# Patient Record
Sex: MALE | Race: WHITE | NOT HISPANIC OR LATINO | Employment: FULL TIME | URBAN - NONMETROPOLITAN AREA
[De-identification: names, ages, dates, MRNs, and addresses within clinical notes are randomized per-mention and may not be internally consistent; named-entity substitution may affect disease eponyms.]

---

## 2018-03-07 ENCOUNTER — HOSPITAL ENCOUNTER (EMERGENCY)
Facility: HOSPITAL | Age: 33
Discharge: HOME | End: 2018-03-07
Attending: EMERGENCY MEDICINE | Admitting: EMERGENCY MEDICINE

## 2018-03-07 VITALS
OXYGEN SATURATION: 99 % | TEMPERATURE: 98.1 F | HEIGHT: 70 IN | SYSTOLIC BLOOD PRESSURE: 127 MMHG | BODY MASS INDEX: 37.22 KG/M2 | RESPIRATION RATE: 18 BRPM | WEIGHT: 260 LBS | DIASTOLIC BLOOD PRESSURE: 74 MMHG | HEART RATE: 95 BPM

## 2018-03-07 DIAGNOSIS — S81.812A LACERATION OF LEFT LEG, INITIAL ENCOUNTER: Primary | ICD-10-CM

## 2018-03-07 PROCEDURE — 90715 TDAP VACCINE 7 YRS/> IM: CPT | Performed by: NURSE PRACTITIONER

## 2018-03-07 PROCEDURE — 25010000002 TDAP 5-2.5-18.5 LF-MCG/0.5 SUSPENSION: Performed by: NURSE PRACTITIONER

## 2018-03-07 PROCEDURE — 90471 IMMUNIZATION ADMIN: CPT | Performed by: NURSE PRACTITIONER

## 2018-03-07 PROCEDURE — 99283 EMERGENCY DEPT VISIT LOW MDM: CPT

## 2018-03-07 RX ORDER — OXYCODONE AND ACETAMINOPHEN 10; 325 MG/1; MG/1
1 TABLET ORAL ONCE
Status: COMPLETED | OUTPATIENT
Start: 2018-03-07 | End: 2018-03-07

## 2018-03-07 RX ORDER — BACITRACIN, NEOMYCIN, POLYMYXIN B 400; 3.5; 5 [USP'U]/G; MG/G; [USP'U]/G
OINTMENT TOPICAL
Status: COMPLETED
Start: 2018-03-07 | End: 2018-03-07

## 2018-03-07 RX ORDER — BACITRACIN, NEOMYCIN, POLYMYXIN B 400; 3.5; 5 [USP'U]/G; MG/G; [USP'U]/G
OINTMENT TOPICAL EVERY 6 HOURS SCHEDULED
Status: COMPLETED | OUTPATIENT
Start: 2018-03-07 | End: 2018-03-07

## 2018-03-07 RX ADMIN — BACITRACIN, NEOMYCIN, POLYMYXIN B 1 APPLICATION: 400; 3.5; 5 OINTMENT TOPICAL at 16:20

## 2018-03-07 RX ADMIN — TETANUS TOXOID, REDUCED DIPHTHERIA TOXOID AND ACELLULAR PERTUSSIS VACCINE, ADSORBED 0.5 ML: 5; 2.5; 8; 8; 2.5 SUSPENSION INTRAMUSCULAR at 15:22

## 2018-03-07 RX ADMIN — OXYCODONE HYDROCHLORIDE AND ACETAMINOPHEN 1 TABLET: 10; 325 TABLET ORAL at 15:21

## 2018-03-07 NOTE — DISCHARGE INSTRUCTIONS
"    Hypertension  Hypertension, commonly called high blood pressure, is when the force of blood pumping through the arteries is too strong. The arteries are the blood vessels that carry blood from the heart throughout the body. Hypertension forces the heart to work harder to pump blood and may cause arteries to become narrow or stiff. Having untreated or uncontrolled hypertension can cause heart attacks, strokes, kidney disease, and other problems.  A blood pressure reading consists of a higher number over a lower number. Ideally, your blood pressure should be below 120/80. The first (\"top\") number is called the systolic pressure. It is a measure of the pressure in your arteries as your heart beats. The second (\"bottom\") number is called the diastolic pressure. It is a measure of the pressure in your arteries as the heart relaxes.  What are the causes?  The cause of this condition is not known.  What increases the risk?  Some risk factors for high blood pressure are under your control. Others are not.  Factors you can change   · Smoking.  · Having type 2 diabetes mellitus, high cholesterol, or both.  · Not getting enough exercise or physical activity.  · Being overweight.  · Having too much fat, sugar, calories, or salt (sodium) in your diet.  · Drinking too much alcohol.  Factors that are difficult or impossible to change   · Having chronic kidney disease.  · Having a family history of high blood pressure.  · Age. Risk increases with age.  · Race. You may be at higher risk if you are -American.  · Gender. Men are at higher risk than women before age 45. After age 65, women are at higher risk than men.  · Having obstructive sleep apnea.  · Stress.  What are the signs or symptoms?  Extremely high blood pressure (hypertensive crisis) may cause:  · Headache.  · Anxiety.  · Shortness of breath.  · Nosebleed.  · Nausea and vomiting.  · Severe chest pain.  · Jerky movements you cannot control (seizures).  How is " this diagnosed?  This condition is diagnosed by measuring your blood pressure while you are seated, with your arm resting on a surface. The cuff of the blood pressure monitor will be placed directly against the skin of your upper arm at the level of your heart. It should be measured at least twice using the same arm. Certain conditions can cause a difference in blood pressure between your right and left arms.  Certain factors can cause blood pressure readings to be lower or higher than normal (elevated) for a short period of time:  · When your blood pressure is higher when you are in a health care provider's office than when you are at home, this is called white coat hypertension. Most people with this condition do not need medicines.  · When your blood pressure is higher at home than when you are in a health care provider's office, this is called masked hypertension. Most people with this condition may need medicines to control blood pressure.  If you have a high blood pressure reading during one visit or you have normal blood pressure with other risk factors:  · You may be asked to return on a different day to have your blood pressure checked again.  · You may be asked to monitor your blood pressure at home for 1 week or longer.  If you are diagnosed with hypertension, you may have other blood or imaging tests to help your health care provider understand your overall risk for other conditions.  How is this treated?  This condition is treated by making healthy lifestyle changes, such as eating healthy foods, exercising more, and reducing your alcohol intake. Your health care provider may prescribe medicine if lifestyle changes are not enough to get your blood pressure under control, and if:  · Your systolic blood pressure is above 130.  · Your diastolic blood pressure is above 80.  Your personal target blood pressure may vary depending on your medical conditions, your age, and other factors.  Follow these  instructions at home:  Eating and drinking   · Eat a diet that is high in fiber and potassium, and low in sodium, added sugar, and fat. An example eating plan is called the DASH (Dietary Approaches to Stop Hypertension) diet. To eat this way:  ¨ Eat plenty of fresh fruits and vegetables. Try to fill half of your plate at each meal with fruits and vegetables.  ¨ Eat whole grains, such as whole wheat pasta, brown rice, or whole grain bread. Fill about one quarter of your plate with whole grains.  ¨ Eat or drink low-fat dairy products, such as skim milk or low-fat yogurt.  ¨ Avoid fatty cuts of meat, processed or cured meats, and poultry with skin. Fill about one quarter of your plate with lean proteins, such as fish, chicken without skin, beans, eggs, and tofu.  ¨ Avoid premade and processed foods. These tend to be higher in sodium, added sugar, and fat.  · Reduce your daily sodium intake. Most people with hypertension should eat less than 1,500 mg of sodium a day.  · Limit alcohol intake to no more than 1 drink a day for nonpregnant women and 2 drinks a day for men. One drink equals 12 oz of beer, 5 oz of wine, or 1½ oz of hard liquor.  Lifestyle   · Work with your health care provider to maintain a healthy body weight or to lose weight. Ask what an ideal weight is for you.  · Get at least 30 minutes of exercise that causes your heart to beat faster (aerobic exercise) most days of the week. Activities may include walking, swimming, or biking.  · Include exercise to strengthen your muscles (resistance exercise), such as pilates or lifting weights, as part of your weekly exercise routine. Try to do these types of exercises for 30 minutes at least 3 days a week.  · Do not use any products that contain nicotine or tobacco, such as cigarettes and e-cigarettes. If you need help quitting, ask your health care provider.  · Monitor your blood pressure at home as told by your health care provider.  · Keep all follow-up visits  as told by your health care provider. This is important.  Medicines   · Take over-the-counter and prescription medicines only as told by your health care provider. Follow directions carefully. Blood pressure medicines must be taken as prescribed.  · Do not skip doses of blood pressure medicine. Doing this puts you at risk for problems and can make the medicine less effective.  · Ask your health care provider about side effects or reactions to medicines that you should watch for.  Contact a health care provider if:  · You think you are having a reaction to a medicine you are taking.  · You have headaches that keep coming back (recurring).  · You feel dizzy.  · You have swelling in your ankles.  · You have trouble with your vision.  Get help right away if:  · You develop a severe headache or confusion.  · You have unusual weakness or numbness.  · You feel faint.  · You have severe pain in your chest or abdomen.  · You vomit repeatedly.  · You have trouble breathing.  Summary  · Hypertension is when the force of blood pumping through your arteries is too strong. If this condition is not controlled, it may put you at risk for serious complications.  · Your personal target blood pressure may vary depending on your medical conditions, your age, and other factors. For most people, a normal blood pressure is less than 120/80.  · Hypertension is treated with lifestyle changes, medicines, or a combination of both. Lifestyle changes include weight loss, eating a healthy, low-sodium diet, exercising more, and limiting alcohol.  This information is not intended to replace advice given to you by your health care provider. Make sure you discuss any questions you have with your health care provider.  Document Released: 12/18/2006 Document Revised: 11/15/2017 Document Reviewed: 11/15/2017  [employer] Interactive Patient Education © 2017 [employer]

## 2018-03-21 NOTE — ED PROVIDER NOTES
Subjective   History of Present Illness    Review of Systems    History reviewed. No pertinent past medical history.    No Known Allergies    Past Surgical History:   Procedure Laterality Date   • APPENDECTOMY     • HAND SURGERY     • KNEE ACL RECONSTRUCTION         History reviewed. No pertinent family history.    Social History     Social History   • Marital status:      Social History Main Topics   • Smoking status: Never Smoker   • Drug use: Unknown     Other Topics Concern   • Not on file           Objective   Physical Exam    Laceration Repair  Date/Time: 3/21/2018 5:29 PM  Performed by: MICHAEL NEW  Authorized by: MICHAEL ENW     Consent:     Consent obtained:  Verbal    Consent given by:  Patient    Risks discussed:  Infection, pain and poor cosmetic result  Anesthesia (see MAR for exact dosages):     Anesthesia method:  Local infiltration    Local anesthetic:  Lidocaine 1% w/o epi  Laceration details:     Location:  Leg    Leg location:  L upper leg    Length (cm):  5    Depth (mm):  10  Repair type:     Repair type:  Simple  Pre-procedure details:     Preparation:  Patient was prepped and draped in usual sterile fashion  Exploration:     Wound exploration: wound explored through full range of motion    Treatment:     Area cleansed with:  Betadine and saline    Amount of cleaning:  Extensive    Visualized foreign bodies/material removed: no    Skin repair:     Repair method:  Sutures    Suture size:  3-0    Suture material:  Nylon    Number of sutures:  6  Approximation:     Approximation:  Loose  Post-procedure details:     Dressing:  Sterile dressing             ED Course  ED Course                  MDM    Final diagnoses:   Laceration of left leg, initial encounter            Michael New MD  03/21/18 0344

## 2018-03-22 NOTE — ED PROVIDER NOTES
Subjective     History provided by:  Patient  Laceration   Location:  Leg  Leg laceration location:  L upper leg  Length:  5  Depth:  Cutaneous  Bleeding: venous    Laceration mechanism:  Metal edge  Pain details:     Quality:  Throbbing    Severity:  Mild  Foreign body present:  No foreign bodies  Relieved by:  None tried  Worsened by:  Movement  Ineffective treatments:  None tried  Tetanus status:  Out of date  Associated symptoms: no fever        Review of Systems   Constitutional: Negative.  Negative for fever.   HENT: Negative.    Respiratory: Negative.    Cardiovascular: Negative.  Negative for chest pain.   Gastrointestinal: Negative.  Negative for abdominal pain.   Endocrine: Negative.    Genitourinary: Negative.  Negative for dysuria.   Skin: Negative.    Neurological: Negative.    Psychiatric/Behavioral: Negative.    All other systems reviewed and are negative.      History reviewed. No pertinent past medical history.    No Known Allergies    Past Surgical History:   Procedure Laterality Date   • APPENDECTOMY     • HAND SURGERY     • KNEE ACL RECONSTRUCTION         History reviewed. No pertinent family history.    Social History     Social History   • Marital status:      Social History Main Topics   • Smoking status: Never Smoker   • Drug use: Unknown     Other Topics Concern   • Not on file           Objective   Physical Exam   Constitutional: He is oriented to person, place, and time. He appears well-developed and well-nourished. No distress.   HENT:   Head: Normocephalic and atraumatic.   Right Ear: External ear normal.   Left Ear: External ear normal.   Nose: Nose normal.   Eyes: Conjunctivae and EOM are normal. Pupils are equal, round, and reactive to light.   Neck: Normal range of motion. Neck supple. No JVD present. No tracheal deviation present.   Cardiovascular: Normal rate, regular rhythm and normal heart sounds.    No murmur heard.  Pulmonary/Chest: Effort normal and breath sounds  normal. No respiratory distress. He has no wheezes.   Abdominal: Soft. Bowel sounds are normal. There is no tenderness.   Musculoskeletal: Normal range of motion. He exhibits no edema or deformity.   Neurological: He is alert and oriented to person, place, and time. No cranial nerve deficit.   Skin: Skin is warm and dry. Laceration noted. No rash noted. He is not diaphoretic. No erythema. No pallor.   Psychiatric: He has a normal mood and affect. His behavior is normal. Thought content normal.   Nursing note and vitals reviewed.      Procedures         ED Course  ED Course                  MDM    Final diagnoses:   Laceration of left leg, initial encounter            Kathie Hull, APRBURTON  03/21/18 2007

## 2019-09-23 ENCOUNTER — TRANSCRIBE ORDERS (OUTPATIENT)
Dept: ADMINISTRATIVE | Facility: HOSPITAL | Age: 34
End: 2019-09-23

## 2019-09-23 DIAGNOSIS — M54.5 LOW BACK PAIN, UNSPECIFIED BACK PAIN LATERALITY, UNSPECIFIED CHRONICITY, WITH SCIATICA PRESENCE UNSPECIFIED: Primary | ICD-10-CM

## 2019-10-15 ENCOUNTER — OFFICE VISIT (OUTPATIENT)
Dept: UROLOGY | Facility: CLINIC | Age: 34
End: 2019-10-15

## 2019-10-15 VITALS
WEIGHT: 274 LBS | BODY MASS INDEX: 39.22 KG/M2 | DIASTOLIC BLOOD PRESSURE: 84 MMHG | HEIGHT: 70 IN | SYSTOLIC BLOOD PRESSURE: 134 MMHG

## 2019-10-15 DIAGNOSIS — E29.1 HYPOGONADISM, TESTICULAR: Primary | ICD-10-CM

## 2019-10-15 DIAGNOSIS — R35.0 FREQUENCY OF URINATION: ICD-10-CM

## 2019-10-15 LAB
BILIRUB BLD-MCNC: NEGATIVE MG/DL
CLARITY, POC: CLEAR
COLOR UR: YELLOW
GLUCOSE UR STRIP-MCNC: NEGATIVE MG/DL
KETONES UR QL: NEGATIVE
LEUKOCYTE EST, POC: NEGATIVE
NITRITE UR-MCNC: NEGATIVE MG/ML
PH UR: 7.5 [PH] (ref 5–8)
PROT UR STRIP-MCNC: NEGATIVE MG/DL
RBC # UR STRIP: NEGATIVE /UL
SP GR UR: 1.01 (ref 1–1.03)
UROBILINOGEN UR QL: NORMAL

## 2019-10-15 PROCEDURE — 81003 URINALYSIS AUTO W/O SCOPE: CPT | Performed by: UROLOGY

## 2019-10-15 PROCEDURE — 99204 OFFICE O/P NEW MOD 45 MIN: CPT | Performed by: UROLOGY

## 2019-10-15 RX ORDER — FINASTERIDE 5 MG/1
2.5 TABLET, FILM COATED ORAL DAILY
COMMUNITY
End: 2019-10-15

## 2019-10-15 RX ORDER — ESCITALOPRAM OXALATE 20 MG/1
TABLET ORAL DAILY
COMMUNITY
Start: 2019-07-31

## 2019-10-15 RX ORDER — TESTOSTERONE CYPIONATE 200 MG/ML
200 INJECTION, SOLUTION INTRAMUSCULAR WEEKLY
Qty: 10 ML | Refills: 5 | Status: SHIPPED | OUTPATIENT
Start: 2019-10-15 | End: 2019-10-16

## 2019-10-15 RX ORDER — OMEPRAZOLE 20 MG/1
CAPSULE, DELAYED RELEASE ORAL DAILY
COMMUNITY
Start: 2019-10-10

## 2019-10-15 RX ORDER — LAMOTRIGINE 100 MG/1
100 TABLET ORAL DAILY
COMMUNITY

## 2019-10-15 RX ORDER — ANASTROZOLE 1 MG/1
1 TABLET ORAL DAILY
COMMUNITY
End: 2020-09-29

## 2019-10-15 RX ORDER — BUSPIRONE HYDROCHLORIDE 5 MG/1
TABLET ORAL
COMMUNITY
Start: 2019-08-06

## 2019-10-15 NOTE — PROGRESS NOTES
Chief Complaint:          hypogonadism    HPI:   34 y.o. male.  Pt has been on testosterone for low testosterone.  He was on 1 cc week and this was upped to 1 1/2 cc weekly.  His testosterone was 370 cc 3 days after 1 cc injection.  HPI  Pt has been placed on finasteride for excessive hair growth.    Past Medical History:        Past Medical History:   Diagnosis Date   • Anxiety and depression    • Low testosterone          Current Meds:     Current Outpatient Medications   Medication Sig Dispense Refill   • anastrozole (ARIMIDEX) 1 MG tablet Take 1 mg by mouth Daily.     • busPIRone (BUSPAR) 5 MG tablet      • escitalopram (LEXAPRO) 20 MG tablet Daily.     • finasteride (PROSCAR) 5 MG tablet Take 2.5 mg by mouth Daily.     • lamoTRIgine (LaMICtal) 100 MG tablet Take 100 mg by mouth Daily.     • omeprazole (priLOSEC) 20 MG capsule Daily.     • raNITIdine HCl (RANITIDINE 150 MAX STRENGTH PO) 2 (Two) Times a Day.       No current facility-administered medications for this visit.         Allergies:      No Known Allergies     Past Surgical History:     Past Surgical History:   Procedure Laterality Date   • APPENDECTOMY     • HAND SURGERY     • KNEE ACL RECONSTRUCTION           Social History:     Social History     Socioeconomic History   • Marital status:      Spouse name: Not on file   • Number of children: Not on file   • Years of education: Not on file   • Highest education level: Not on file   Tobacco Use   • Smoking status: Never Smoker       Family History:     History reviewed. No pertinent family history.    Review of Systems:     Review of Systems   Constitutional: Positive for fatigue. Negative for chills and fever.   HENT: Negative for sinus pressure and sinus pain.    Respiratory: Negative for cough.    Cardiovascular: Negative for leg swelling.   Gastrointestinal: Negative for abdominal pain.   Genitourinary: Negative for dysuria, frequency and urgency.   Musculoskeletal: Negative for back pain.    Neurological: Negative for headaches.   Psychiatric/Behavioral: The patient is not nervous/anxious.        IPSS Questionnaire (AUA-7):  Over the past month…    1)  Incomplete Emptying  How often have you had a sensation of not emptying your bladder?  0 - Not at all   2)  Frequency  How often have you had to urinate less than every two hours? 0 - Not at all   3)  Intermittency  How often have you found you stopped and started again several times when you urinated?  0 - Not at all   4) Urgency  How often have you found it difficult to postpone urination?  0 - Not at all   5) Weak Stream  How often have you had a weak urinary stream?  0 - Not at all   6) Straining  How often have you had to push or strain to begin urination?  0 - Not at all   7) Nocturia  How many times did you typically get up at night to urinate?  1 - 1 time   Total Score:  1       Quality of life due to urinary symptoms:  If you were to spend the rest of your life with your urinary condition the way it is now, how would you feel about that? 0-Delighted   Urine Leakage (Incontinence) 0-No Leakage       I have reviewed the follow portions of the patient's history and confirmed they are accurate today:  allergies, current medications, past family history, past medical history, past social history, past surgical history, problem list and ROS  Physical Exam:     Physical Exam   Constitutional: He is oriented to person, place, and time.   HENT:   Head: Normocephalic and atraumatic.   Right Ear: External ear normal.   Left Ear: External ear normal.   Nose: Nose normal.   Mouth/Throat: Oropharynx is clear and moist.   Eyes: Conjunctivae and EOM are normal. Pupils are equal, round, and reactive to light.   Neck: Normal range of motion. Neck supple. No thyromegaly present.   Cardiovascular: Normal rate, regular rhythm, normal heart sounds and intact distal pulses.   No murmur heard.  Pulmonary/Chest: Effort normal and breath sounds normal. No respiratory  "distress. He has no wheezes. He has no rales. He exhibits no tenderness.   Abdominal: Soft. Bowel sounds are normal. He exhibits no distension and no mass. There is no tenderness. No hernia.   Genitourinary: Rectum normal, prostate normal and penis normal.   Musculoskeletal: Normal range of motion. He exhibits no edema or tenderness.   Lymphadenopathy:     He has no cervical adenopathy.   Neurological: He is alert and oriented to person, place, and time. No cranial nerve deficit. He exhibits normal muscle tone. Coordination normal.   Skin: Skin is warm. No rash noted.   Psychiatric: He has a normal mood and affect. His behavior is normal. Judgment and thought content normal.   Nursing note and vitals reviewed.      /84 (BP Location: Left arm, Patient Position: Sitting, Cuff Size: Large Adult)   Ht 177.8 cm (70\")   Wt 124 kg (274 lb)   BMI 39.31 kg/m²    Procedure:         Assessment:     Encounter Diagnosis   Name Primary?   • Frequency of urination Yes       Orders Placed This Encounter   Procedures   • POC Urinalysis Dipstick, Automated       Patient reports that he is not currently experiencing any symptoms of urinary incontinence.      Plan:   I discussed how low testosterone can be symptomatic and how injection lead to a peak and trough.  I also explained how 5 alpha reductase effects testosterone.  Pt is not balld but has good hair growth.    Patient's Body mass index is 39.31 kg/m². BMI is above normal parameters. Recommendations include: educational material.      Smoking Cessation Counseling:  Never a smoker.  Patient does not currently use any tobacco products.       Counseling was given to patient for the following topics instructions for management as follows: hypogonadism. The interim medical history and current results were reviewed.  A treatment plan with follow-up was made for Frequency of urination [R35.0].   This document has been electronically signed by Ramin Mcnulty MD October 15, " 2019 11:05 AM

## 2019-10-16 ENCOUNTER — TELEPHONE (OUTPATIENT)
Dept: UROLOGY | Facility: CLINIC | Age: 34
End: 2019-10-16

## 2019-10-17 DIAGNOSIS — E29.1 HYPOGONADISM IN MALE: Primary | ICD-10-CM

## 2019-10-18 ENCOUNTER — PRIOR AUTHORIZATION (OUTPATIENT)
Dept: UROLOGY | Facility: CLINIC | Age: 34
End: 2019-10-18

## 2019-11-08 ENCOUNTER — TELEPHONE (OUTPATIENT)
Dept: UROLOGY | Facility: CLINIC | Age: 34
End: 2019-11-08

## 2019-11-08 NOTE — TELEPHONE ENCOUNTER
I called the patient who called and asked if Dr. Mcnulty would up his dosage to 1.5. He said last time he was on Testosterone his old DrLucy Had him in 1.5 weekly and it really made the difference in it lasting a full 7 days. I told him Pricila was in the OR and I would not be able to talk to him until Monday morning and I would call him and let him know more then.

## 2019-11-12 ENCOUNTER — TELEPHONE (OUTPATIENT)
Dept: UROLOGY | Facility: CLINIC | Age: 34
End: 2019-11-12

## 2019-11-12 NOTE — TELEPHONE ENCOUNTER
PT CALLED ASKING IF THE PRESCRIPTION HAD BEEN CALLED HE CALLED THEM THEY ARE SAYING IT HAS NOT BEEN CHANGED

## 2019-11-13 ENCOUNTER — TELEPHONE (OUTPATIENT)
Dept: UROLOGY | Facility: CLINIC | Age: 34
End: 2019-11-13

## 2019-11-13 NOTE — TELEPHONE ENCOUNTER
Pt called stating that his pharmacy did not get the call that I verified yesterday with them. I explained to the pt that I will call back right now and make sure that they understand.    Spoke to the pharmacist again who verified that the serum is in the works as we speak.

## 2020-01-07 PROCEDURE — 84403 ASSAY OF TOTAL TESTOSTERONE: CPT | Performed by: UROLOGY

## 2020-01-07 PROCEDURE — 84153 ASSAY OF PSA TOTAL: CPT | Performed by: UROLOGY

## 2020-01-15 ENCOUNTER — OFFICE VISIT (OUTPATIENT)
Dept: UROLOGY | Facility: CLINIC | Age: 35
End: 2020-01-15

## 2020-01-15 VITALS — WEIGHT: 280 LBS | BODY MASS INDEX: 40.09 KG/M2 | HEIGHT: 70 IN

## 2020-01-15 DIAGNOSIS — E29.1 HYPOGONADISM IN MALE: Primary | ICD-10-CM

## 2020-01-15 LAB
ALBUMIN SERPL-MCNC: 4.3 G/DL (ref 3.5–5.2)
ALP SERPL-CCNC: 87 U/L (ref 39–117)
ALT SERPL W P-5'-P-CCNC: 21 U/L (ref 1–41)
AST SERPL-CCNC: 29 U/L (ref 1–40)
BILIRUB CONJ SERPL-MCNC: <0.2 MG/DL (ref 0.2–0.3)
BILIRUB INDIRECT SERPL-MCNC: ABNORMAL MG/DL
BILIRUB SERPL-MCNC: 0.8 MG/DL (ref 0.2–1.2)
DEPRECATED RDW RBC AUTO: 38.6 FL (ref 37–54)
ERYTHROCYTE [DISTWIDTH] IN BLOOD BY AUTOMATED COUNT: 16.7 % (ref 12.3–15.4)
HCT VFR BLD AUTO: 50 % (ref 37.5–51)
HGB BLD-MCNC: 15.2 G/DL (ref 13–17.7)
MCH RBC QN AUTO: 22.5 PG (ref 26.6–33)
MCHC RBC AUTO-ENTMCNC: 30.4 G/DL (ref 31.5–35.7)
MCV RBC AUTO: 74 FL (ref 79–97)
PLATELET # BLD AUTO: 255 10*3/MM3 (ref 140–450)
PMV BLD AUTO: 11.2 FL (ref 6–12)
PROT SERPL-MCNC: 7.1 G/DL (ref 6–8.5)
RBC # BLD AUTO: 6.76 10*6/MM3 (ref 4.14–5.8)
WBC NRBC COR # BLD: 6.42 10*3/MM3 (ref 3.4–10.8)

## 2020-01-15 PROCEDURE — 80076 HEPATIC FUNCTION PANEL: CPT | Performed by: UROLOGY

## 2020-01-15 PROCEDURE — 85027 COMPLETE CBC AUTOMATED: CPT | Performed by: UROLOGY

## 2020-01-15 PROCEDURE — 99214 OFFICE O/P EST MOD 30 MIN: CPT | Performed by: UROLOGY

## 2020-01-15 RX ORDER — TESTOSTERONE CYPIONATE 200 MG/ML
200 INJECTION, SOLUTION INTRAMUSCULAR WEEKLY
Qty: 10 ML | Refills: 3 | Status: SHIPPED | OUTPATIENT
Start: 2020-01-15 | End: 2020-08-24

## 2020-01-15 NOTE — PROGRESS NOTES
"Chief Complaint:          hypogonadism    HPI:   34 y.o. male.  Pt has felt better on testosterone.  He does not have the lows that he did on the 1cc weekly dose.  His testosterone was a peak on Wednesday.  His dose was 1.5 cc.  HPI      Past Medical History:        Past Medical History:   Diagnosis Date   • Anxiety and depression    • Low testosterone          Current Meds:     Current Outpatient Medications   Medication Sig Dispense Refill   • anastrozole (ARIMIDEX) 1 MG tablet Take 1 mg by mouth Daily.     • busPIRone (BUSPAR) 5 MG tablet      • escitalopram (LEXAPRO) 20 MG tablet Daily.     • lamoTRIgine (LaMICtal) 100 MG tablet Take 100 mg by mouth Daily.     • Needle, Disp, 22G X 1-1/2\" misc Patient needs 22 g needles to inject testosterone cypionate IM. 4 each 5   • omeprazole (priLOSEC) 20 MG capsule Daily.     • raNITIdine HCl (RANITIDINE 150 MAX STRENGTH PO) 2 (Two) Times a Day.     • Syringe 18G X 1-1/2\" 3 ML misc Use 18 g needle to draw up testosterone cypionate then switch to 22 g to inject IM. 4 each 5     No current facility-administered medications for this visit.         Allergies:      No Known Allergies     Past Surgical History:     Past Surgical History:   Procedure Laterality Date   • APPENDECTOMY     • HAND SURGERY     • KNEE ACL RECONSTRUCTION           Social History:     Social History     Socioeconomic History   • Marital status:      Spouse name: Not on file   • Number of children: Not on file   • Years of education: Not on file   • Highest education level: Not on file   Tobacco Use   • Smoking status: Never Smoker   • Smokeless tobacco: Never Used   Substance and Sexual Activity   • Alcohol use: Yes     Frequency: Never   • Drug use: No   • Sexual activity: Defer       Family History:     Family History   Problem Relation Age of Onset   • Diabetes Father    • No Known Problems Mother    • Colon cancer Paternal Grandfather    • Heart attack Maternal Grandfather        Review of " "Systems:     Review of Systems   Constitutional: Negative for chills, fatigue and fever.   HENT: Negative for congestion and sinus pressure.    Respiratory: Negative for chest tightness and shortness of breath.    Cardiovascular: Negative for chest pain.   Gastrointestinal: Negative for abdominal pain, constipation, diarrhea, nausea and vomiting.   Genitourinary: Negative for flank pain, frequency, hematuria and urgency.   Musculoskeletal: Negative for back pain and neck pain.   Skin: Negative for rash.   Neurological: Negative for dizziness and headaches.   Hematological: Does not bruise/bleed easily.   Psychiatric/Behavioral: The patient is not nervous/anxious.        Physical Exam:     Physical Exam    Ht 177.8 cm (70\")   Wt 127 kg (280 lb)   BMI 40.18 kg/m²    Procedure:         Assessment:     Encounter Diagnosis   Name Primary?   • Hypogonadism in male Yes       No orders of the defined types were placed in this encounter.      Patient reports that he is not currently experiencing any symptoms of urinary incontinence.      Plan:   Will check his cbc and lft's.  Will see him in 6 months.    Patient's Body mass index is 40.18 kg/m². BMI is within normal parameters. No follow-up required..      Smoking Cessation Counseling:  Never a smoker.  Patient does not currently use any tobacco products.     Counseling was given to patient for the following topics instructions for management as follows: hypogonadism. The interim medical history and current results were reviewed.  A treatment plan with follow-up was made for Hypogonadism in male [E29.1].   This document has been electronically signed by Ramin Mcnulty MD January 15, 2020 8:57 AM      "

## 2020-07-27 ENCOUNTER — LAB (OUTPATIENT)
Dept: UROLOGY | Facility: CLINIC | Age: 35
End: 2020-07-27

## 2020-07-27 DIAGNOSIS — E29.1 HYPOGONADISM IN MALE: Primary | ICD-10-CM

## 2020-07-27 DIAGNOSIS — N42.9 DISORDER OF PROSTATE: ICD-10-CM

## 2020-07-27 LAB
ALBUMIN SERPL-MCNC: 4.4 G/DL (ref 3.5–5.2)
ALP SERPL-CCNC: 77 U/L (ref 39–117)
ALT SERPL W P-5'-P-CCNC: 23 U/L (ref 1–41)
AST SERPL-CCNC: 27 U/L (ref 1–40)
BILIRUB CONJ SERPL-MCNC: 0.2 MG/DL (ref 0–0.3)
BILIRUB INDIRECT SERPL-MCNC: 1.2 MG/DL
BILIRUB SERPL-MCNC: 1.4 MG/DL (ref 0–1.2)
DEPRECATED RDW RBC AUTO: 50.1 FL (ref 37–54)
ERYTHROCYTE [DISTWIDTH] IN BLOOD BY AUTOMATED COUNT: 20.9 % (ref 12.3–15.4)
ESTRADIOL SERPL HS-MCNC: 159 PG/ML
HCT VFR BLD AUTO: 51 % (ref 37.5–51)
HGB BLD-MCNC: 15.8 G/DL (ref 13–17.7)
MCH RBC QN AUTO: 22.8 PG (ref 26.6–33)
MCHC RBC AUTO-ENTMCNC: 31 G/DL (ref 31.5–35.7)
MCV RBC AUTO: 73.7 FL (ref 79–97)
PLATELET # BLD AUTO: 278 10*3/MM3 (ref 140–450)
PMV BLD AUTO: 10.5 FL (ref 6–12)
PROT SERPL-MCNC: 7.1 G/DL (ref 6–8.5)
PSA SERPL-MCNC: 1.58 NG/ML (ref 0–4)
RBC # BLD AUTO: 6.92 10*6/MM3 (ref 4.14–5.8)
TESTOST SERPL-MCNC: 1243 NG/DL (ref 249–836)
WBC # BLD AUTO: 7.63 10*3/MM3 (ref 3.4–10.8)

## 2020-07-27 PROCEDURE — 84153 ASSAY OF PSA TOTAL: CPT | Performed by: UROLOGY

## 2020-07-27 PROCEDURE — 85027 COMPLETE CBC AUTOMATED: CPT | Performed by: UROLOGY

## 2020-07-27 PROCEDURE — 84403 ASSAY OF TOTAL TESTOSTERONE: CPT | Performed by: UROLOGY

## 2020-07-27 PROCEDURE — 80076 HEPATIC FUNCTION PANEL: CPT | Performed by: UROLOGY

## 2020-07-27 PROCEDURE — 36415 COLL VENOUS BLD VENIPUNCTURE: CPT | Performed by: UROLOGY

## 2020-07-27 PROCEDURE — 82670 ASSAY OF TOTAL ESTRADIOL: CPT | Performed by: UROLOGY

## 2020-08-23 DIAGNOSIS — E29.1 HYPOGONADISM IN MALE: ICD-10-CM

## 2020-08-24 RX ORDER — TESTOSTERONE CYPIONATE 200 MG/ML
INJECTION, SOLUTION INTRAMUSCULAR
Qty: 10 ML | Refills: 2 | Status: SHIPPED | OUTPATIENT
Start: 2020-08-24 | End: 2020-08-26 | Stop reason: SDUPTHER

## 2020-08-26 ENCOUNTER — OFFICE VISIT (OUTPATIENT)
Dept: UROLOGY | Facility: CLINIC | Age: 35
End: 2020-08-26

## 2020-08-26 VITALS — HEIGHT: 70 IN | TEMPERATURE: 97.6 F | BODY MASS INDEX: 41.23 KG/M2 | WEIGHT: 288 LBS

## 2020-08-26 DIAGNOSIS — E29.1 HYPOGONADISM IN MALE: ICD-10-CM

## 2020-08-26 PROCEDURE — 99214 OFFICE O/P EST MOD 30 MIN: CPT | Performed by: UROLOGY

## 2020-08-26 RX ORDER — TESTOSTERONE CYPIONATE 200 MG/ML
200 INJECTION, SOLUTION INTRAMUSCULAR WEEKLY
Qty: 10 ML | Refills: 3 | Status: SHIPPED | OUTPATIENT
Start: 2020-08-26 | End: 2020-08-26 | Stop reason: SDUPTHER

## 2020-08-26 RX ORDER — TESTOSTERONE CYPIONATE 200 MG/ML
200 INJECTION, SOLUTION INTRAMUSCULAR WEEKLY
Qty: 10 ML | Refills: 3 | Status: SHIPPED | OUTPATIENT
Start: 2020-08-26 | End: 2020-09-29 | Stop reason: SDUPTHER

## 2020-08-26 NOTE — PROGRESS NOTES
"Chief Complaint:        hypogonadism      HPI:   35 y.o. male.  Pt is on 1 1/2 cc testosterone injections his testostosterone was 1243 three days after the injection.  HPI      Past Medical History:        Past Medical History:   Diagnosis Date   • Anxiety and depression    • Low testosterone          Current Meds:     Current Outpatient Medications   Medication Sig Dispense Refill   • anastrozole (ARIMIDEX) 1 MG tablet Take 1 mg by mouth Daily.     • busPIRone (BUSPAR) 5 MG tablet      • escitalopram (LEXAPRO) 20 MG tablet Daily.     • lamoTRIgine (LaMICtal) 100 MG tablet Take 100 mg by mouth Daily.     • Needle, Disp, 22G X 1-1/2\" misc Patient needs 22 g needles to inject testosterone cypionate IM. 4 each 5   • omeprazole (priLOSEC) 20 MG capsule Daily.     • raNITIdine HCl (RANITIDINE 150 MAX STRENGTH PO) 2 (Two) Times a Day.     • Syringe 18G X 1-1/2\" 3 ML misc Use 18 g needle to draw up testosterone cypionate then switch to 22 g to inject IM. 4 each 5   • Testosterone Cypionate (DEPOTESTOTERONE CYPIONATE) 200 MG/ML injection INJECT 1.5 ML INTRAMUSCULARLY ONCE WEEKLY AS DIRECTED BY PRESCRIBER 10 mL 2     No current facility-administered medications for this visit.         Allergies:      No Known Allergies     Past Surgical History:     Past Surgical History:   Procedure Laterality Date   • APPENDECTOMY     • HAND SURGERY     • KNEE ACL RECONSTRUCTION           Social History:     Social History     Socioeconomic History   • Marital status:      Spouse name: Not on file   • Number of children: Not on file   • Years of education: Not on file   • Highest education level: Not on file   Tobacco Use   • Smoking status: Never Smoker   • Smokeless tobacco: Never Used   Substance and Sexual Activity   • Alcohol use: Yes     Frequency: Never   • Drug use: No   • Sexual activity: Defer       Family History:     Family History   Problem Relation Age of Onset   • Diabetes Father    • No Known Problems Mother    • " Colon cancer Paternal Grandfather    • Heart attack Maternal Grandfather        Review of Systems:     Review of Systems   Constitutional: Negative for chills, fatigue and fever.   Respiratory: Negative for cough, shortness of breath and wheezing.    Cardiovascular: Negative for leg swelling.   Gastrointestinal: Negative for abdominal pain, nausea and vomiting.   Musculoskeletal: Negative for back pain and joint swelling.   Neurological: Negative for dizziness and headaches.   Psychiatric/Behavioral: Negative for confusion.             I have reviewed the follow portions of the patient's history and confirmed they are accurate today:  allergies, current medications, past family history, past medical history, past social history, past surgical history, problem list and ROS  Physical Exam:     Physical Exam   Constitutional: He is oriented to person, place, and time.   HENT:   Head: Normocephalic and atraumatic.   Right Ear: External ear normal.   Left Ear: External ear normal.   Nose: Nose normal.   Mouth/Throat: Oropharynx is clear and moist.   Eyes: Pupils are equal, round, and reactive to light. Conjunctivae and EOM are normal.   Neck: Normal range of motion. Neck supple. No thyromegaly present.   Cardiovascular: Normal rate, regular rhythm, normal heart sounds and intact distal pulses.   No murmur heard.  Pulmonary/Chest: Effort normal and breath sounds normal. No respiratory distress. He has no wheezes. He has no rales. He exhibits no tenderness.   Abdominal: Soft. Bowel sounds are normal. He exhibits no distension and no mass. There is no tenderness. No hernia.   Genitourinary: Rectum normal and penis normal.   Musculoskeletal: Normal range of motion. He exhibits no edema or tenderness.   Lymphadenopathy:     He has no cervical adenopathy.   Neurological: He is alert and oriented to person, place, and time. No cranial nerve deficit. He exhibits normal muscle tone. Coordination normal.   Skin: Skin is warm. No  rash noted.   Psychiatric: He has a normal mood and affect. His behavior is normal. Judgment and thought content normal.   Nursing note and vitals reviewed.      There were no vitals taken for this visit.   Procedure:         Assessment:   No diagnosis found.    No orders of the defined types were placed in this encounter.      Patient reports that he is not currently experiencing any symptoms of urinary incontinence.      Plan:   Will reduce his testosterone to 1.2 cc.  Pt would like to be on an estrogen blocker that he has been on before and it lowered his moodiness.  Pt has noticed some increase breast tenderness.  Will repeat labs and see pt back in     Smoking Cessation Counseling:  Never a smoker.  Patient does not currently use any tobacco products.     Counseling was given to patient for the following topics instructions for management as follows: hypogonadism. The interim medical history and current results were reviewed.  A treatment plan with follow-up was made for No primary diagnosis found.. I spent 33 minutes face to face with David Escalona and 80 percentage was spent in counseling.       This document has been electronically signed by Ramin Mcnulty MD August 26, 2020 14:26

## 2020-09-21 ENCOUNTER — LAB (OUTPATIENT)
Dept: UROLOGY | Facility: CLINIC | Age: 35
End: 2020-09-21

## 2020-09-21 DIAGNOSIS — E29.1 HYPOGONADISM IN MALE: Primary | ICD-10-CM

## 2020-09-21 LAB — ESTRADIOL SERPL HS-MCNC: 73.9 PG/ML

## 2020-09-21 PROCEDURE — 82670 ASSAY OF TOTAL ESTRADIOL: CPT | Performed by: UROLOGY

## 2020-09-21 PROCEDURE — 36415 COLL VENOUS BLD VENIPUNCTURE: CPT | Performed by: UROLOGY

## 2020-09-21 PROCEDURE — 84403 ASSAY OF TOTAL TESTOSTERONE: CPT | Performed by: UROLOGY

## 2020-09-21 PROCEDURE — 84402 ASSAY OF FREE TESTOSTERONE: CPT | Performed by: UROLOGY

## 2020-09-24 LAB
TESTOST FREE SERPL-MCNC: 33.5 PG/ML (ref 8.7–25.1)
TESTOST SERPL-MCNC: 1006 NG/DL (ref 264–916)

## 2020-09-29 ENCOUNTER — OFFICE VISIT (OUTPATIENT)
Dept: UROLOGY | Facility: CLINIC | Age: 35
End: 2020-09-29

## 2020-09-29 VITALS — TEMPERATURE: 97.7 F | BODY MASS INDEX: 40.66 KG/M2 | WEIGHT: 284 LBS | HEIGHT: 70 IN

## 2020-09-29 DIAGNOSIS — E29.1 HYPOGONADISM IN MALE: Primary | ICD-10-CM

## 2020-09-29 PROCEDURE — 99213 OFFICE O/P EST LOW 20 MIN: CPT | Performed by: UROLOGY

## 2020-09-29 RX ORDER — ANASTROZOLE 1 MG/1
1 TABLET ORAL WEEKLY
Qty: 12 TABLET | Refills: 0 | Status: SHIPPED | OUTPATIENT
Start: 2020-09-29

## 2020-09-29 RX ORDER — TESTOSTERONE CYPIONATE 200 MG/ML
200 INJECTION, SOLUTION INTRAMUSCULAR WEEKLY
Qty: 10 ML | Refills: 3 | Status: SHIPPED | OUTPATIENT
Start: 2020-09-29

## 2020-09-29 NOTE — PROGRESS NOTES
"Chief Complaint:          Hypogonadism.    HPI:   35 y.o. male.  Pt had a testosterone that was 1006 on 1.2 cc a week injection.  He used to be on anastrozol to lower his estradiol with Dr Ferreira when he managed his estradiol level is 73.9  HPI      Past Medical History:        Past Medical History:   Diagnosis Date   • Anxiety and depression    • Low testosterone          Current Meds:     Current Outpatient Medications   Medication Sig Dispense Refill   • busPIRone (BUSPAR) 5 MG tablet      • escitalopram (LEXAPRO) 20 MG tablet Daily.     • lamoTRIgine (LaMICtal) 100 MG tablet Take 100 mg by mouth Daily.     • Needle, Disp, 22G X 1-1/2\" misc Patient needs 22 g needles to inject testosterone cypionate IM. 4 each 5   • omeprazole (priLOSEC) 20 MG capsule Daily.     • raNITIdine HCl (RANITIDINE 150 MAX STRENGTH PO) 2 (Two) Times a Day.     • Syringe 18G X 1-1/2\" 3 ML misc Use 18 g needle to draw up testosterone cypionate then switch to 22 g to inject IM. 4 each 5   • Testosterone Cypionate (DEPOTESTOTERONE CYPIONATE) 200 MG/ML injection Inject 1 mL into the appropriate muscle as directed by prescriber 1 (One) Time Per Week. 10 mL 3   • anastrozole (ARIMIDEX) 1 MG tablet Take 1 tablet by mouth 1 (One) Time Per Week. 12 tablet 0   • anastrozole (ARIMIDEX) 1 MG tablet Take 1 tablet by mouth 1 (One) Time Per Week. 12 tablet 0     No current facility-administered medications for this visit.         Allergies:      No Known Allergies     Past Surgical History:     Past Surgical History:   Procedure Laterality Date   • APPENDECTOMY     • HAND SURGERY     • KNEE ACL RECONSTRUCTION           Social History:     Social History     Socioeconomic History   • Marital status:      Spouse name: Not on file   • Number of children: Not on file   • Years of education: Not on file   • Highest education level: Not on file   Tobacco Use   • Smoking status: Never Smoker   • Smokeless tobacco: Never Used   Substance and Sexual " "Activity   • Alcohol use: Yes     Frequency: Never   • Drug use: No   • Sexual activity: Defer       Family History:     Family History   Problem Relation Age of Onset   • Diabetes Father    • No Known Problems Mother    • Colon cancer Paternal Grandfather    • Heart attack Maternal Grandfather        Review of Systems:     Review of Systems   Constitutional: Negative for chills, fatigue and fever.   HENT: Positive for sinus pressure. Negative for congestion.    Eyes: Negative for pain and redness.   Respiratory: Negative for chest tightness and shortness of breath.    Cardiovascular: Negative for chest pain.   Gastrointestinal: Negative for abdominal pain, constipation, diarrhea, nausea and vomiting.   Genitourinary: Negative for dysuria, flank pain, frequency, hematuria and urgency.   Musculoskeletal: Negative for back pain and neck pain.   Skin: Negative for rash.   Allergic/Immunologic: Negative for food allergies.   Neurological: Positive for headaches. Negative for dizziness.   Hematological: Does not bruise/bleed easily.   Psychiatric/Behavioral: The patient is not nervous/anxious.          Physical Exam:     Physical Exam    Temp 97.7 °F (36.5 °C)   Ht 177.8 cm (70\")   Wt 129 kg (284 lb)   BMI 40.75 kg/m²    Procedure:         Assessment:     Encounter Diagnosis   Name Primary?   • Hypogonadism in male Yes       Orders Placed This Encounter   Procedures   • Estradiol   • Testosterone   • CBC (No Diff)       Patient reports that he is not currently experiencing any symptoms of urinary incontinence.      Plan:   Will starte him on arimidex 1 mg weekly for 12 weeks.  He will keep his testosterone injections at 1.2 cc weekly.  Will repeat his testoserrone and estradiol and lft's and CBC in 6 months.    Patient's Body mass index is 40.75 kg/m². BMI is above normal parameters. Recommendations include: referral to primary care.      Smoking Cessation Counseling:  Never a smoker.  Patient does not currently use " any tobacco products.     Counseling was given to patient for the following topics instructions for management as follows: hypogonadism and elevated estradiol. The interim medical history and current results were reviewed.  A treatment plan with follow-up was made for Hypogonadism in male [E29.1]. I spent 27 minutes face to face with David Escalona and 80 percentage was spent in counseling.       This document has been electronically signed by Ramin Mcnulty MD September 30, 2020 12:20 EDT

## 2020-12-01 DIAGNOSIS — E29.1 HYPOGONADISM IN MALE: ICD-10-CM

## 2020-12-02 NOTE — TELEPHONE ENCOUNTER
Called [phone]      Approved from 11/2/20 til  12/2/21    Notified pt - he stated he needed needles and syringes.

## 2021-03-23 ENCOUNTER — BULK ORDERING (OUTPATIENT)
Dept: CASE MANAGEMENT | Facility: OTHER | Age: 36
End: 2021-03-23

## 2021-03-23 DIAGNOSIS — Z23 IMMUNIZATION DUE: ICD-10-CM

## 2023-10-09 ENCOUNTER — ANESTHESIA EVENT CONVERTED (OUTPATIENT)
Dept: ANESTHESIOLOGY | Facility: HOSPITAL | Age: 38
End: 2023-10-09
Payer: OTHER GOVERNMENT

## 2023-10-09 ENCOUNTER — APPOINTMENT (OUTPATIENT)
Dept: GENERAL RADIOLOGY | Facility: HOSPITAL | Age: 38
End: 2023-10-09
Payer: OTHER GOVERNMENT

## 2023-10-09 ENCOUNTER — ANESTHESIA (OUTPATIENT)
Dept: PERIOP | Facility: HOSPITAL | Age: 38
End: 2023-10-09
Payer: OTHER GOVERNMENT

## 2023-10-09 ENCOUNTER — HOSPITAL ENCOUNTER (OUTPATIENT)
Facility: HOSPITAL | Age: 38
Setting detail: HOSPITAL OUTPATIENT SURGERY
Discharge: HOME | End: 2023-10-09
Attending: ORTHOPAEDIC SURGERY | Admitting: ORTHOPAEDIC SURGERY
Payer: OTHER GOVERNMENT

## 2023-10-09 ENCOUNTER — ANESTHESIA EVENT (OUTPATIENT)
Dept: PERIOP | Facility: HOSPITAL | Age: 38
End: 2023-10-09
Payer: OTHER GOVERNMENT

## 2023-10-09 VITALS
TEMPERATURE: 97.9 F | RESPIRATION RATE: 14 BRPM | HEIGHT: 70 IN | HEART RATE: 61 BPM | BODY MASS INDEX: 38.65 KG/M2 | DIASTOLIC BLOOD PRESSURE: 83 MMHG | OXYGEN SATURATION: 94 % | WEIGHT: 270 LBS | SYSTOLIC BLOOD PRESSURE: 126 MMHG

## 2023-10-09 PROCEDURE — 25010000002 DEXAMETHASONE PER 1 MG: Performed by: NURSE ANESTHETIST, CERTIFIED REGISTERED

## 2023-10-09 PROCEDURE — 25010000002 FENTANYL CITRATE (PF) 50 MCG/ML SOLUTION

## 2023-10-09 PROCEDURE — C1713 ANCHOR/SCREW BN/BN,TIS/BN: HCPCS | Performed by: ORTHOPAEDIC SURGERY

## 2023-10-09 PROCEDURE — 25010000002 BUPIVACAINE (PF) 0.25 % SOLUTION: Performed by: NURSE ANESTHETIST, CERTIFIED REGISTERED

## 2023-10-09 PROCEDURE — 76000 FLUOROSCOPY <1 HR PHYS/QHP: CPT

## 2023-10-09 PROCEDURE — 25010000002 DEXAMETHASONE SODIUM PHOSPHATE 10 MG/ML SOLUTION: Performed by: NURSE ANESTHETIST, CERTIFIED REGISTERED

## 2023-10-09 PROCEDURE — 25010000002 BUPRENORPHINE PER 0.1 MG: Performed by: NURSE ANESTHETIST, CERTIFIED REGISTERED

## 2023-10-09 PROCEDURE — 25010000002 ONDANSETRON PER 1 MG

## 2023-10-09 PROCEDURE — 25810000003 LACTATED RINGERS PER 1000 ML: Performed by: ANESTHESIOLOGY

## 2023-10-09 PROCEDURE — 25010000002 SUGAMMADEX 200 MG/2ML SOLUTION: Performed by: ANESTHESIOLOGY

## 2023-10-09 PROCEDURE — 25010000002 CEFAZOLIN PER 500 MG: Performed by: ORTHOPAEDIC SURGERY

## 2023-10-09 PROCEDURE — 25010000002 FENTANYL CITRATE (PF) 100 MCG/2ML SOLUTION: Performed by: NURSE ANESTHETIST, CERTIFIED REGISTERED

## 2023-10-09 PROCEDURE — 25010000002 PROPOFOL 10 MG/ML EMULSION: Performed by: NURSE ANESTHETIST, CERTIFIED REGISTERED

## 2023-10-09 DEVICE — SUTURE
Type: IMPLANTABLE DEVICE | Site: HIP | Status: FUNCTIONAL
Brand: XBRAID S

## 2023-10-09 DEVICE — NANOTACK SUTURE ANCHOR 1.4MM WITH FLEX INSERTER
Type: IMPLANTABLE DEVICE | Site: HIP | Status: FUNCTIONAL
Brand: NANOTACK

## 2023-10-09 RX ORDER — MEPERIDINE HYDROCHLORIDE 25 MG/ML
12.5 INJECTION INTRAMUSCULAR; INTRAVENOUS; SUBCUTANEOUS
Status: DISCONTINUED | OUTPATIENT
Start: 2023-10-09 | End: 2023-10-09 | Stop reason: HOSPADM

## 2023-10-09 RX ORDER — SODIUM CHLORIDE 9 MG/ML
40 INJECTION, SOLUTION INTRAVENOUS AS NEEDED
Status: DISCONTINUED | OUTPATIENT
Start: 2023-10-09 | End: 2023-10-09 | Stop reason: HOSPADM

## 2023-10-09 RX ORDER — DROPERIDOL 2.5 MG/ML
0.62 INJECTION, SOLUTION INTRAMUSCULAR; INTRAVENOUS
Status: DISCONTINUED | OUTPATIENT
Start: 2023-10-09 | End: 2023-10-09 | Stop reason: HOSPADM

## 2023-10-09 RX ORDER — NALOXONE HCL 0.4 MG/ML
0.4 VIAL (ML) INJECTION AS NEEDED
Status: DISCONTINUED | OUTPATIENT
Start: 2023-10-09 | End: 2023-10-09 | Stop reason: HOSPADM

## 2023-10-09 RX ORDER — HYDROMORPHONE HYDROCHLORIDE 1 MG/ML
0.5 INJECTION, SOLUTION INTRAMUSCULAR; INTRAVENOUS; SUBCUTANEOUS
Status: DISCONTINUED | OUTPATIENT
Start: 2023-10-09 | End: 2023-10-09 | Stop reason: HOSPADM

## 2023-10-09 RX ORDER — OXYCODONE HYDROCHLORIDE 5 MG/1
TABLET ORAL
Status: COMPLETED
Start: 2023-10-09 | End: 2023-10-09

## 2023-10-09 RX ORDER — PROMETHAZINE HYDROCHLORIDE 25 MG/1
25 TABLET ORAL ONCE AS NEEDED
Status: DISCONTINUED | OUTPATIENT
Start: 2023-10-09 | End: 2023-10-09 | Stop reason: HOSPADM

## 2023-10-09 RX ORDER — DEXAMETHASONE SODIUM PHOSPHATE 4 MG/ML
INJECTION, SOLUTION INTRA-ARTICULAR; INTRALESIONAL; INTRAMUSCULAR; INTRAVENOUS; SOFT TISSUE AS NEEDED
Status: DISCONTINUED | OUTPATIENT
Start: 2023-10-09 | End: 2023-10-09 | Stop reason: SURG

## 2023-10-09 RX ORDER — SODIUM CHLORIDE 0.9 % (FLUSH) 0.9 %
3 SYRINGE (ML) INJECTION EVERY 12 HOURS SCHEDULED
Status: DISCONTINUED | OUTPATIENT
Start: 2023-10-09 | End: 2023-10-09 | Stop reason: HOSPADM

## 2023-10-09 RX ORDER — BUPIVACAINE HYDROCHLORIDE 2.5 MG/ML
INJECTION, SOLUTION EPIDURAL; INFILTRATION; INTRACAUDAL
Status: COMPLETED | OUTPATIENT
Start: 2023-10-09 | End: 2023-10-09

## 2023-10-09 RX ORDER — LABETALOL HYDROCHLORIDE 5 MG/ML
5 INJECTION, SOLUTION INTRAVENOUS
Status: DISCONTINUED | OUTPATIENT
Start: 2023-10-09 | End: 2023-10-09 | Stop reason: HOSPADM

## 2023-10-09 RX ORDER — MAGNESIUM HYDROXIDE 1200 MG/15ML
LIQUID ORAL AS NEEDED
Status: DISCONTINUED | OUTPATIENT
Start: 2023-10-09 | End: 2023-10-09 | Stop reason: HOSPADM

## 2023-10-09 RX ORDER — DEXAMETHASONE SODIUM PHOSPHATE 10 MG/ML
INJECTION, SOLUTION INTRAMUSCULAR; INTRAVENOUS
Status: COMPLETED | OUTPATIENT
Start: 2023-10-09 | End: 2023-10-09

## 2023-10-09 RX ORDER — HYDROCODONE BITARTRATE AND ACETAMINOPHEN 5; 325 MG/1; MG/1
1 TABLET ORAL ONCE AS NEEDED
Status: DISCONTINUED | OUTPATIENT
Start: 2023-10-09 | End: 2023-10-09 | Stop reason: HOSPADM

## 2023-10-09 RX ORDER — FAMOTIDINE 20 MG/1
20 TABLET, FILM COATED ORAL ONCE
Status: COMPLETED | OUTPATIENT
Start: 2023-10-09 | End: 2023-10-09

## 2023-10-09 RX ORDER — DROPERIDOL 2.5 MG/ML
0.62 INJECTION, SOLUTION INTRAMUSCULAR; INTRAVENOUS ONCE AS NEEDED
Status: DISCONTINUED | OUTPATIENT
Start: 2023-10-09 | End: 2023-10-09 | Stop reason: HOSPADM

## 2023-10-09 RX ORDER — MIDAZOLAM HYDROCHLORIDE 1 MG/ML
1 INJECTION INTRAMUSCULAR; INTRAVENOUS
Status: DISCONTINUED | OUTPATIENT
Start: 2023-10-09 | End: 2023-10-09 | Stop reason: HOSPADM

## 2023-10-09 RX ORDER — FENTANYL CITRATE 50 UG/ML
INJECTION, SOLUTION INTRAMUSCULAR; INTRAVENOUS
Status: COMPLETED
Start: 2023-10-09 | End: 2023-10-09

## 2023-10-09 RX ORDER — FENTANYL CITRATE 50 UG/ML
INJECTION, SOLUTION INTRAMUSCULAR; INTRAVENOUS AS NEEDED
Status: DISCONTINUED | OUTPATIENT
Start: 2023-10-09 | End: 2023-10-09 | Stop reason: SDUPTHER

## 2023-10-09 RX ORDER — SODIUM CHLORIDE, SODIUM LACTATE, POTASSIUM CHLORIDE, CALCIUM CHLORIDE 600; 310; 30; 20 MG/100ML; MG/100ML; MG/100ML; MG/100ML
9 INJECTION, SOLUTION INTRAVENOUS CONTINUOUS
Status: DISCONTINUED | OUTPATIENT
Start: 2023-10-09 | End: 2023-10-09 | Stop reason: HOSPADM

## 2023-10-09 RX ORDER — BUPRENORPHINE HYDROCHLORIDE 0.32 MG/ML
INJECTION INTRAMUSCULAR; INTRAVENOUS
Status: COMPLETED | OUTPATIENT
Start: 2023-10-09 | End: 2023-10-09

## 2023-10-09 RX ORDER — HYDRALAZINE HYDROCHLORIDE 20 MG/ML
5 INJECTION INTRAMUSCULAR; INTRAVENOUS
Status: DISCONTINUED | OUTPATIENT
Start: 2023-10-09 | End: 2023-10-09 | Stop reason: HOSPADM

## 2023-10-09 RX ORDER — PROPOFOL 10 MG/ML
VIAL (ML) INTRAVENOUS AS NEEDED
Status: DISCONTINUED | OUTPATIENT
Start: 2023-10-09 | End: 2023-10-09 | Stop reason: SURG

## 2023-10-09 RX ORDER — OXYCODONE HYDROCHLORIDE 5 MG/1
5 TABLET ORAL ONCE
Status: COMPLETED | OUTPATIENT
Start: 2023-10-09 | End: 2023-10-09

## 2023-10-09 RX ORDER — ONDANSETRON 2 MG/ML
INJECTION INTRAMUSCULAR; INTRAVENOUS
Status: COMPLETED
Start: 2023-10-09 | End: 2023-10-09

## 2023-10-09 RX ORDER — SODIUM CHLORIDE 0.9 % (FLUSH) 0.9 %
10 SYRINGE (ML) INJECTION EVERY 12 HOURS SCHEDULED
Status: DISCONTINUED | OUTPATIENT
Start: 2023-10-09 | End: 2023-10-09 | Stop reason: HOSPADM

## 2023-10-09 RX ORDER — FENTANYL CITRATE 50 UG/ML
50 INJECTION, SOLUTION INTRAMUSCULAR; INTRAVENOUS
Status: DISCONTINUED | OUTPATIENT
Start: 2023-10-09 | End: 2023-10-09 | Stop reason: HOSPADM

## 2023-10-09 RX ORDER — PROMETHAZINE HYDROCHLORIDE 25 MG/1
25 SUPPOSITORY RECTAL ONCE AS NEEDED
Status: DISCONTINUED | OUTPATIENT
Start: 2023-10-09 | End: 2023-10-09 | Stop reason: HOSPADM

## 2023-10-09 RX ORDER — ONDANSETRON 2 MG/ML
4 INJECTION INTRAMUSCULAR; INTRAVENOUS ONCE AS NEEDED
Status: COMPLETED | OUTPATIENT
Start: 2023-10-09 | End: 2023-10-09

## 2023-10-09 RX ORDER — IPRATROPIUM BROMIDE AND ALBUTEROL SULFATE 2.5; .5 MG/3ML; MG/3ML
3 SOLUTION RESPIRATORY (INHALATION) ONCE AS NEEDED
Status: DISCONTINUED | OUTPATIENT
Start: 2023-10-09 | End: 2023-10-09 | Stop reason: HOSPADM

## 2023-10-09 RX ORDER — ROCURONIUM BROMIDE 10 MG/ML
INJECTION, SOLUTION INTRAVENOUS AS NEEDED
Status: DISCONTINUED | OUTPATIENT
Start: 2023-10-09 | End: 2023-10-09 | Stop reason: SURG

## 2023-10-09 RX ORDER — LIDOCAINE HYDROCHLORIDE 10 MG/ML
INJECTION, SOLUTION EPIDURAL; INFILTRATION; INTRACAUDAL; PERINEURAL AS NEEDED
Status: DISCONTINUED | OUTPATIENT
Start: 2023-10-09 | End: 2023-10-09 | Stop reason: SURG

## 2023-10-09 RX ORDER — LIDOCAINE HYDROCHLORIDE 10 MG/ML
0.5 INJECTION, SOLUTION EPIDURAL; INFILTRATION; INTRACAUDAL; PERINEURAL ONCE AS NEEDED
Status: COMPLETED | OUTPATIENT
Start: 2023-10-09 | End: 2023-10-09

## 2023-10-09 RX ORDER — SODIUM CHLORIDE 0.9 % (FLUSH) 0.9 %
3-10 SYRINGE (ML) INJECTION AS NEEDED
Status: DISCONTINUED | OUTPATIENT
Start: 2023-10-09 | End: 2023-10-09 | Stop reason: HOSPADM

## 2023-10-09 RX ORDER — FAMOTIDINE 10 MG/ML
20 INJECTION, SOLUTION INTRAVENOUS ONCE
Status: CANCELLED | OUTPATIENT
Start: 2023-10-09 | End: 2023-10-09

## 2023-10-09 RX ORDER — SODIUM CHLORIDE 0.9 % (FLUSH) 0.9 %
10 SYRINGE (ML) INJECTION AS NEEDED
Status: DISCONTINUED | OUTPATIENT
Start: 2023-10-09 | End: 2023-10-09 | Stop reason: HOSPADM

## 2023-10-09 RX ORDER — CEFAZOLIN SODIUM IN 0.9 % NACL 3 G/100 ML
3000 INTRAVENOUS SOLUTION, PIGGYBACK (ML) INTRAVENOUS ONCE
Status: COMPLETED | OUTPATIENT
Start: 2023-10-09 | End: 2023-10-09

## 2023-10-09 RX ADMIN — ROCURONIUM BROMIDE 30 MG: 10 SOLUTION INTRAVENOUS at 14:21

## 2023-10-09 RX ADMIN — FENTANYL CITRATE 100 MCG: 50 INJECTION, SOLUTION INTRAMUSCULAR; INTRAVENOUS at 13:37

## 2023-10-09 RX ADMIN — CEFAZOLIN 3000 MG: 10 INJECTION, POWDER, FOR SOLUTION INTRAVENOUS at 13:42

## 2023-10-09 RX ADMIN — ROCURONIUM BROMIDE 10 MG: 10 SOLUTION INTRAVENOUS at 15:24

## 2023-10-09 RX ADMIN — SODIUM CHLORIDE, POTASSIUM CHLORIDE, SODIUM LACTATE AND CALCIUM CHLORIDE 9 ML/HR: 600; 310; 30; 20 INJECTION, SOLUTION INTRAVENOUS at 13:13

## 2023-10-09 RX ADMIN — DEXAMETHASONE SODIUM PHOSPHATE 2 MG: 10 INJECTION, SOLUTION INTRAMUSCULAR; INTRAVENOUS at 13:41

## 2023-10-09 RX ADMIN — FENTANYL CITRATE 50 MCG: 50 INJECTION, SOLUTION INTRAMUSCULAR; INTRAVENOUS at 18:00

## 2023-10-09 RX ADMIN — ROCURONIUM BROMIDE 50 MG: 10 SOLUTION INTRAVENOUS at 13:37

## 2023-10-09 RX ADMIN — FAMOTIDINE 20 MG: 20 TABLET, FILM COATED ORAL at 13:13

## 2023-10-09 RX ADMIN — BUPRENORPHINE HYDROCHLORIDE 0.3 MG: 0.32 INJECTION INTRAMUSCULAR; INTRAVENOUS at 13:41

## 2023-10-09 RX ADMIN — ROCURONIUM BROMIDE 5 MG: 10 SOLUTION INTRAVENOUS at 16:53

## 2023-10-09 RX ADMIN — PROPOFOL 200 MG: 10 INJECTION, EMULSION INTRAVENOUS at 13:37

## 2023-10-09 RX ADMIN — SUGAMMADEX 200 MG: 100 INJECTION, SOLUTION INTRAVENOUS at 17:05

## 2023-10-09 RX ADMIN — OXYCODONE HYDROCHLORIDE 5 MG: 5 TABLET ORAL at 19:20

## 2023-10-09 RX ADMIN — DEXAMETHASONE SODIUM PHOSPHATE 4 MG: 4 INJECTION, SOLUTION INTRAMUSCULAR; INTRAVENOUS at 13:42

## 2023-10-09 RX ADMIN — BUPIVACAINE HYDROCHLORIDE 30 ML: 2.5 INJECTION, SOLUTION EPIDURAL; INFILTRATION; INTRACAUDAL; PERINEURAL at 13:41

## 2023-10-09 RX ADMIN — ONDANSETRON 4 MG: 2 INJECTION INTRAMUSCULAR; INTRAVENOUS at 19:17

## 2023-10-09 RX ADMIN — LIDOCAINE HYDROCHLORIDE 0.5 ML: 10 INJECTION, SOLUTION EPIDURAL; INFILTRATION; INTRACAUDAL; PERINEURAL at 13:13

## 2023-10-09 RX ADMIN — LIDOCAINE HYDROCHLORIDE 50 MG: 10 INJECTION, SOLUTION EPIDURAL; INFILTRATION; INTRACAUDAL; PERINEURAL at 13:37

## 2023-10-09 NOTE — ANESTHESIA PREPROCEDURE EVALUATION
Anesthesia Evaluation     Patient summary reviewed and Nursing notes reviewed   no history of anesthetic complications:   NPO Solid Status: > 8 hours  NPO Liquid Status: > 8 hours           Airway   Mallampati: II  TM distance: >3 FB  Neck ROM: full  Dental      Pulmonary - negative pulmonary ROS and normal exam   Cardiovascular - negative cardio ROS and normal exam        Neuro/Psych  (+) psychiatric history  GI/Hepatic/Renal/Endo    (+) GERD    Musculoskeletal     Abdominal    Substance History      OB/GYN          Other                    Anesthesia Plan    ASA 2     general     intravenous induction     Anesthetic plan, risks, benefits, and alternatives have been provided, discussed and informed consent has been obtained with: patient.    Plan discussed with CRNA.    CODE STATUS:

## 2023-10-09 NOTE — ANESTHESIA PROCEDURE NOTES
Peripheral Block                 Subsequent Stages Histo Method Verbiage: Using a similar technique to that described above, a thin layer of tissue was removed from all areas where tumor was visible on the previous stage.  The tissue was again oriented, mapped, dyed, and processed as above.

## 2023-10-09 NOTE — OP NOTE
HIP ARTHROSCOPY WITH LABRAL REPAIR  Procedure Report    Patient Name:  David Escalona  YOB: 1985    Date of Surgery:  10/9/2023     Indications: 38-year-old with longstanding history of right hip pain this been refractory to conservative measures.  Treatment options were discussed at length including operative versus nonoperative treatment.  Risks and benefits of surgery were discussed including but not limited to bleeding, infection, injury to surrounding structures such as blood vessels tendons and nerves. We discussed the possibility of surgical procedure failure, persistent pain, loss of motion, persistent stiffness, persistent weakness, and the need for a revision surgery or conversion to total hip arthroplasty. In addition, we discussed the risk of heart attack, stroke, or death.  Patient understands these as well as alternative forms of treatment and does elect to proceed.    Pre-op Diagnosis:     Right hip labral tear, combined type femoral acetabular impingement    Post-op Diagnosis:       Right hip labral tear, combined type femoral acetabular impingement    Procedure/CPTr Codes:      Procedure(s):  HIP ARTHROSCOPY WITH PARTIAL LABRAL REPAIR, FEMORALPLASTY, ACETABULOPLASTY AND CAPSULAR CLOSURE - RIGHT    Staff:  Surgeon(s):  Bismark Bang MD    Circulator: Abimael Bell RN; Sima Chand RN; Scotty Eddy, JACLYN; Nayeli Deleon RN  Radiology Technologist: Lanie Pike R.T.(R)  Scrub Person: Emy Quiroz; Danay Mcdaniel  Vendor Representative: Dario Trevizo  Nursing Assistant: April Guillen PCT  Assistant: Milton Hannon PA-C     Assistant: Milton Hannon PA-C  was responsible for performing the following activities:   Indication for surgical assistant:  Surgical assistant was indicated for assistance with patient positioning as well as critical portions of the procedure including holding the camera , holding the leg, placement of implants/hardware, and closure of  wounds.    Anesthesia: General    Estimated Blood Loss: 25 mL    Implants:    Implant Name Type Inv. Item Serial No.  Lot No. LRB No. Used Action   SUT XBRAID/S NMBR2 MO/6 TPR 1/2CIR 26MM 39IN FERN/BLK - [id number] Implant SUT XBRAID/S NMBR2 MO/6 TPR 1/2CIR 26MM 39IN FERN/BLK  [employer] [id number] Right 4 Implanted   SUT/ANCH HIP NANOTACK W/FLX INSRT 1.4MM - [id number] Implant SUT/ANCH HIP NANOTACK W/FLX INSRT 1.4MM  [employer] [id number] Right 1 Implanted       Specimen:                None    Complications: None apparent    Description of Procedure:     Patient was identified in the preoperative holding area and the right hip was marked.  Patient was transported to the OR and while on the operative stretcher general anesthesia was induced.  Patient was then transferred to the hip distraction bed and positioned with all bony prominences padded. The right hip was prepped and draped in the usual sterile fashion.  Preoperative time out was performed indicating correct patient, correct side, correct procedure, and that preoperative antibiotic had been given.  Next traction was pulled on the hip and x-rays were used to confirm hip distraction.  The first anterior lateral portal was created using a long spinal needle under C-arm guidance.  The scope was placed in the hip joint and under direct visualization a direct anterior portal was created.  A capsulotomy was created between the 2 portal sites.  Of note the hip at this point was noted to be significantly tight.  I did have to make multiple attempts to introduce the camera into the joint and then very carefully try to make an intraportal capsulotomy to try to avoid any injury to the labrum during this time.  As well as to avoid any damage to the femoral head.  I was gradually able to distract the hip enough in order to see.  During this portion of the procedure I did take off traction multiple times and reapply it in order to best evaluate.  Once I was able  to expose enough evaluation of the central compartment showed grade 2-3  cartilage injury at the labral chondral junction but more centrally no significant changes.  Femoral head was noted to have grade 2-3 changes as well.  No loose bodies, no tear of the ligamentum teres.  The capsule in this area  Was Noted to Be of Poor Quality As Well.  Evaluation of the labrum showed tearing of the labrum from the 3 o'clock to 10 o'clock position on the labral face.  Evaluation of the acetabulum did show significant overhang of acetabular bone.  A bur was used to perform an acetabuloplasty to resect the excess bone in the area and to prepare the acetabular rim for labral fixation.  This was done through the entire area of over coverage noted on the preoperative CT termite.  Once complete I did engine back to the labrum.  Due to the significantly deficient labral quality through much of the area of significant acetabular overhang I did not feel that this area was amendable to repair.  Multiple areas there is no good remaining labral tissue at all.  I did use a shaver and burner to resect the unhealthy tissue back to a stable rim of tissue.  The labral tissue closer to the 10 and 11 o'clock position on the labral face was still of decent quality and this was preserved.  I did feel there was an area here which I could to help repair and an anchor was placed at about the 10:30 position in order to reduce some of this tissue.  Part of this also was unable to be repaired even after throwing the suture and this was resected as well.  This left most of the area between the 3:00 and 10 up o'clock position labral deficient.  I did consider my options here.  I felt that a labral reconstruction would be very technically difficult.  I felt that he already had cartilage loss and that it be very likely that I would not provide him with additional benefit by reconstructing his labrum at this point.  Therefore I decided to proceed with a  debridement and partial repair only.    Next the femoral head neck junction was evaluated.  There was noted to be a very large cam lesion at the femoral head neck junction over the anterior lateral aspect of the femoral neck.  Under fluoroscopic guidance using the hip check system the femoral Cam lesion was debrided using an arthroscopic bur.  Exposure for this was very difficult as well and I did use traction sutures in the distal capsule in order to better visualize.  Once this was complete resection was confirmed using C arm.      Next, I did proceed with a capsular closure utilizing 1 free #2 force wire nonabsorbable sutures.  I was unable to get this 1 suture because the majority the capsule was of poor and of quality that no suture was going to hold.  I was able to least reapproximate some of the capsular tissue to hopefully allow to scar in this position.  Excess fluid was drained from the hip and the portals were closed using 3-0 nylon.  The leg was then washed and dressed.  The patient was placed into a hip brace to protect and stabilize the repair.  The patient was then awoken and taken to the recovery room in stable condition.    Disposition:   Patient partial weightbearing to the right lower extremity with an assist device at all times.  Patient will start physical therapy as soon as possible per the protocol provided.  I will plan on seeing him back in 10-14 days for first postoperative check and removal of sutures at that point.    Bismark Bang MD     Date: 10/9/2023  Time: 17:38 EDT

## 2023-10-09 NOTE — ANESTHESIA PROCEDURE NOTES
PENG      Patient reassessed immediately prior to procedure    Reason for block: at surgeon's request and post-op pain management  Performed by  CRNA/CAA: Russ Leonard CRNA  Assisted by: Marisa Lemos RN  Preanesthetic Checklist  Completed: patient identified, IV checked, site marked, risks and benefits discussed, surgical consent, monitors and equipment checked, pre-op evaluation and timeout performed  Prep:  Pt Position: supine  Sterile barriers:cap, gloves, mask and washed/disinfected hands  Prep: ChloraPrep  Patient monitoring: blood pressure monitoring, continuous pulse oximetry and EKG  Procedure    Guidance:ultrasound guided    ULTRASOUND INTERPRETATION.  Using ultrasound guidance a 20 G gauge needle was placed in close proximity to the nerve, at which point, under ultrasound guidance anesthetic was injected in the area of the nerve and spread of the anesthesia was seen on ultrasound in close proximity thereto.  There were no abnormalities seen on ultrasound; a digital image was taken; and the patient tolerated the procedure with no complications. Images:still images obtained, printed/placed on chart    Laterality:right  Block Type:PENG (PENG)  Injection Technique:single-shot  Needle Type:echogenic and short-bevel  Needle Gauge:20 G  Resistance on Injection: none    Medications Used: dexamethasone sodium phosphate injection - Injection   2 mg - 10/9/2023 1:41:00 PM  bupivacaine PF (MARCAINE) 0.25 % injection - Injection   30 mL - 10/9/2023 1:41:00 PM  buprenorphine (BUPRENEX) injection - Injection   0.3 mg - 10/9/2023 1:41:00 PM      Medications  Preservative Free Saline:10ml    Post Assessment  Injection Assessment: negative aspiration for heme, no paresthesia on injection and incremental injection  Patient Tolerance:comfortable throughout block  Complications:no  Additional Notes  A curvilinear ultrasound transducer, with a sterile cover, was placed over the femoral crease in a transverse oblique  "orientation to image the head of the femur. The transducer was moved cranially until the Anterior Inferior Iliac Spine (AIIS), and Iliopubic Eminence (IPE) were visualized (pelvic rim). The insertion site was prepped in sterile fashion and then localized with 2-5 ml of 1% Lidocaine. Using ultrasound-guidance, a 20-gauge B-Adan 4\" Ultraplex 360 non-stimulating echogenic needled was advanced in-plane, lateral to medial. The needle was tracked midway between AIIS and the IPE, thru the Psoas muscle and below the Iliopsoas Tendon.  The tendon was hydro-dissected upward with NS and then local anesthetic was injected in 3-5 ml aliquots. The femoral artery and nerve were visualized superficial to the Iliopsoas Muscle; no spread of local anesthetic was noted toward these structures. Aspiration every 5 ml to prevent intravascular injection. Injection was completed with negative aspiration of blood and negative intravascular injection. Injection pressures were normal with minimal resistance.           "

## 2023-10-09 NOTE — ANESTHESIA POSTPROCEDURE EVALUATION
Patient: David Escalona    Procedure Summary       Date: 10/09/23 Room / Location:  [facility] /  [facility]    Anesthesia Start: 1331 Anesthesia Stop: 1728    Procedure: HIP ARTHROSCOPY WITH PARTIAL LABRAL REPAIR, FEMORALPLASTY, ACETABULOPLASTY AND CAPSULAR CLOSURE - RIGHT (Right: Hip) Diagnosis:     Surgeons: Bismark Bang MD Provider: Mirza Griffin MD    Anesthesia Type: general ASA Status: 2            Anesthesia Type: general    Vitals  Vitals Value Taken Time   /84 10/09/23 1726   Temp     Pulse 64 10/09/23 1728   Resp     SpO2 97 % 10/09/23 1728   Vitals shown include unfiled device data.        Post Anesthesia Care and Evaluation    Patient location during evaluation: PACU  Patient participation: complete - patient participated  Level of consciousness: awake and alert  Pain score: 0  Pain management: adequate    Airway patency: patent  Anesthetic complications: No anesthetic complications  PONV Status: none  Cardiovascular status: hemodynamically stable and acceptable  Respiratory status: nonlabored ventilation, acceptable, nasal cannula and spontaneous ventilation  Hydration status: acceptable

## 2023-10-09 NOTE — ANESTHESIA PROCEDURE NOTES
Airway  Urgency: elective    Date/Time: 10/9/2023 1:39 PM  Airway not difficult    General Information and Staff    Patient location during procedure: OR  CRNA/CAA: Mirza Shultz CRNA    Indications and Patient Condition  Indications for airway management: airway protection    Preoxygenated: yes  MILS not maintained throughout  Mask difficulty assessment: 2 - vent by mask + OA or adjuvant +/- NMBA    Final Airway Details  Final airway type: endotracheal airway      Successful airway: ETT  Cuffed: yes   Successful intubation technique: direct laryngoscopy  Endotracheal tube insertion site: oral  Blade: Calin  Blade size: 4  ETT size (mm): 8.0  Cormack-Lehane Classification: grade IIa - partial view of glottis  Placement verified by: chest auscultation and capnometry   Measured from: lips  ETT/EBT  to lips (cm): 20  Number of attempts at approach: 1  Assessment: lips, teeth, and gum same as pre-op and atraumatic intubation    Additional Comments  Negative epigastric sounds, Breath sound equal bilaterally with symmetric chest rise and fall

## 2023-10-09 NOTE — H&P
"  Pre-Op H&P  David Escalona  [id number]  1985      Chief complaint: Right hip pain      Subjective:  Patient is a 38 y.o.male presents for scheduled surgery by Dr. Bang. He anticipates a HIP ARTHROSCOPY WITH LABRAL REPAIR VS. RECONSTRUCTION, FEMORALPLASTY, ACETABULOPLASTY AND CAPSULAR CLOSURE - RIGHT  today. His hip has been painful for several years. He denies use of assistive device for ambulation or recent falls.       Review of Systems:  Constitutional-- No fever, chills or sweats. No fatigue.  CV-- No chest pain, palpitation or syncope  Resp-- No SOB, cough, hemoptysis  Skin--No rashes or lesions      Allergies: No Known Allergies      Home Meds:  Medications Prior to Admission   Medication Sig Dispense Refill Last Dose    omeprazole (priLOSEC) 20 MG capsule Daily.   10/8/2023 at 0800    anastrozole (ARIMIDEX) 1 MG tablet Take 1 tablet by mouth 1 (One) Time Per Week. 12 tablet 0 More than a month    anastrozole (ARIMIDEX) 1 MG tablet Take 1 tablet by mouth 1 (One) Time Per Week. 12 tablet 0     aspirin (Adult Aspirin Regimen) 81 MG EC tablet Take 1 tablet by mouth Daily. 28 tablet 0     aspirin (Adult Aspirin Regimen) 81 MG EC tablet Take 1 tablet by mouth Daily. 14 tablet 0     busPIRone (BUSPAR) 5 MG tablet    More than a month    escitalopram (LEXAPRO) 20 MG tablet Daily.   More than a month    lamoTRIgine (LaMICtal) 100 MG tablet Take 1 tablet by mouth Daily.   More than a month    naproxen (NAPROSYN) 500 MG tablet Take 1 tablet by mouth 2 (Two) Times a Day. 42 tablet 0 More than a month    Needle, Disp, 22G X 1-1/2\" misc Patient needs 22 g needles to inject testosterone cypionate IM. 4 each 5     ondansetron (ZOFRAN) 4 MG tablet Take 1 tablet by mouth Every 6 (Six) Hours As Needed nausea 30 tablet 0     oxyCODONE (ROXICODONE) 5 MG immediate release tablet Take 1-2 tablets by mouth Every 4 (Four) to 6 (Six) Hours As Needed for post op pain 36 tablet 0     raNITIdine HCl (RANITIDINE 150 MAX " "STRENGTH PO) 2 (Two) Times a Day.   More than a month    Syringe 18G X 1-1/2\" 3 ML misc Use 18 g needle to draw up testosterone cypionate then switch to 22 g to inject IM. 4 each 5     Testosterone Cypionate (DEPOTESTOTERONE CYPIONATE) 200 MG/ML injection Inject 1 mL into the appropriate muscle as directed by prescriber 1 (One) Time Per Week. 10 mL 3 More than a month         PMH:   Past Medical History:   Diagnosis Date    Anxiety and depression     Low testosterone      PSH:    Past Surgical History:   Procedure Laterality Date    APPENDECTOMY      HAND SURGERY      KNEE ACL RECONSTRUCTION      SACROILIAC JOINT FUSION  2023       Immunization History:  Influenza: UTD  Pneumococcal: No  Tetanus: UTD  Covid : x1    Social History:   Tobacco:   Social History     Tobacco Use   Smoking Status Never   Smokeless Tobacco Never      Alcohol:     Social History     Substance and Sexual Activity   Alcohol Use Yes         Physical Exam:BP (!) 143/105 (BP Location: Right arm, Patient Position: Lying)   Pulse 60   Temp 97.2 øF (36.2 øC) (Temporal)   Resp 18   Ht 177.8 cm (70\")   Wt 122 kg (270 lb)   SpO2 98%   BMI 38.74 kg/mý       General Appearance:    Alert, cooperative, no distress, appears stated age   Head:    Normocephalic, without obvious abnormality, atraumatic   Lungs:     Clear to auscultation bilaterally, respirations unlabored    Heart:   Regular rate and rhythm, S1 and S2 normal    Abdomen:    Soft without tenderness   Extremities:   Extremities normal, atraumatic, no cyanosis or edema   Skin:   Skin color, texture, turgor normal, no rashes or lesions   Neurologic:   Grossly intact     Results Review:     LABS:  Lab Results   Component Value Date    WBC 7.63 07/27/2020    HGB 15.8 07/27/2020    HCT 51.0 07/27/2020    MCV 73.7 (L) 07/27/2020     07/27/2020    ALBUMIN 4.40 07/27/2020    AST 27 07/27/2020    ALT 23 07/27/2020    BILITOT 1.4 (H) 07/27/2020       RADIOLOGY:  Imaging Results (Last 72 " Hours)       ** No results found for the last 72 hours. **            I reviewed the patient's new clinical results.    Cancer Staging (if applicable)  Cancer Patient: __ yes __no __unknown; If yes, clinical stage T:__ N:__M:__, stage group or __N/A      Impression: right hip pain      Plan: HIP ARTHROSCOPY WITH LABRAL REPAIR VS. RECONSTRUCTION, FEMORALPLASTY, ACETABULOPLASTY AND CAPSULAR CLOSURE - RIGHT       Angie Helms, SONIYA   10/9/2023   13:12 EDT

## (undated) DEVICE — COVER,MAYO STAND,STERILE: Brand: MEDLINE

## (undated) DEVICE — 6617 IOBAN II PATIENT ISOLATION DRAPE 5/BX,4BX/CS: Brand: STERI-DRAPE™ IOBAN™ 2

## (undated) DEVICE — SLINGSHOT 70 UP: Brand: SLINGSHOT

## (undated) DEVICE — XL TOMCAT, HIP CUTTER. ARTHROSCOPIC SHAVER BLADE. FOR USE WITH: REF 0375-701-500, 0375-704-500, 0375-708-500. DO NOT USE IF PACKAGE IS DAMAGED, KEEP DRY, KEEP AWAY FROM SUNLIGHT: Brand: FORMULA

## (undated) DEVICE — NANOTACK FLEX DRILL BIT: Brand: NANOTACK FLEX

## (undated) DEVICE — PAD ARMBRD SURG CONVOL 7.5X20X2IN

## (undated) DEVICE — PORTAL ENTRY KIT

## (undated) DEVICE — C-ARM DRAPE: Brand: DEROYAL

## (undated) DEVICE — XL, ARTHROSCOPIC SHAVER BLADES, DIAMOND ROUND BUR.  DO NOT RESTERILIZE, DO NOT USE IF PACKAGE IS DAMAGED, KEEP DRY, KEEP AWAY FROM SUNLIGHT: Brand: CROSSBLADE

## (undated) DEVICE — GLV SURG SENSICARE PI MIC PF SZ8 LF STRL

## (undated) DEVICE — 50-S SWEEP + XL, SUCTION PROBE, NON-BENDABLE, XL/HIP LENGTH: Brand: SERFAS ENERGY

## (undated) DEVICE — DRSNG PAD ABD 8X10IN STRL

## (undated) DEVICE — TB CASSET ARTHSCP CROSSFLOW INFLOW LF

## (undated) DEVICE — NANOPASS REACH CRESCENT: Brand: NANOPASS

## (undated) DEVICE — TRANSPORT CANNULA 8MM LENGTH 7, 8, 9: Brand: TRANSPORT

## (undated) DEVICE — SAMURAI BLADE FULL RADIUS: Brand: SAMURAI

## (undated) DEVICE — TP SILK DURAPORE 3IN

## (undated) DEVICE — PK MINOR SPLT 10

## (undated) DEVICE — GLV SURG SENSICARE PI MIC PF SZ7.5 LF STRL

## (undated) DEVICE — SPNG GZ WOVN 4X4IN 12PLY 10/BX STRL

## (undated) DEVICE — Device

## (undated) DEVICE — FLOWPORT II CANNULA WITH OBTURATOR STRYKER 165MM: Brand: FLOWPORT

## (undated) DEVICE — TP MICROFM 4IN LF

## (undated) DEVICE — SUT ETHLN 3/0 FS1 30IN 669H

## (undated) DEVICE — TUBING, SUCTION, 1/4" X 10', STRAIGHT: Brand: MEDLINE

## (undated) DEVICE — APPL CHLORAPREP TINTED 26ML TEAL

## (undated) DEVICE — DRSNG GZ PETROLTM XEROFORM CURAD 1X8IN STRL

## (undated) DEVICE — BLANKT WARM UPPR/BDY ARM/OUT 57X196CM

## (undated) DEVICE — INTENDED FOR TISSUE SEPARATION, AND OTHER PROCEDURES THAT REQUIRE A SHARP SURGICAL BLADE TO PUNCTURE OR CUT.: Brand: BARD-PARKER ® STAINLESS STEEL BLADES